# Patient Record
(demographics unavailable — no encounter records)

---

## 2017-07-10 NOTE — CT
CT BRAIN WITHOUT CONTRAST

 

HISTORY:

Dizziness.  Numbness.

 

FINDINGS:

No evidence of acute infarct, hemorrhage, midline shift, or abnormal extraaxial fluid collections se
en.  The ventricular size is normal.  The basilar system is patent.  The bony calvarium is intact.  
The visualized paranasal sinuses and mastoid air cells are well aerated.

 

IMPRESSION:

No CT evidence of acute intracranial process.

 

POS: [facility]

## 2020-04-27 NOTE — RAD
ONE VIEW CHEST: 

 

History: Dyspnea. 

 

Comparison: 10-

 

FINDINGS: 

Cardiac silhouette and pulmonary vasculature are within normal limits. The lungs remain clear. There 
has been no interval change compared to the prior exam. 

 

IMPRESSION: 

No acute cardiopulmonary process.

 

POS: [facility]